# Patient Record
Sex: FEMALE | Race: OTHER | URBAN - METROPOLITAN AREA
[De-identification: names, ages, dates, MRNs, and addresses within clinical notes are randomized per-mention and may not be internally consistent; named-entity substitution may affect disease eponyms.]

---

## 2023-01-16 ENCOUNTER — APPOINTMENT (RX ONLY)
Dept: URBAN - METROPOLITAN AREA CLINIC 141 | Facility: CLINIC | Age: 83
Setting detail: DERMATOLOGY
End: 2023-01-16

## 2023-01-16 DIAGNOSIS — I87.2 VENOUS INSUFFICIENCY (CHRONIC) (PERIPHERAL): ICD-10-CM

## 2023-01-16 PROCEDURE — ? ENDOVENOUS ABLATION WITH VENASEAL

## 2023-01-16 PROCEDURE — 36483 ENDOVEN THER CHEM ADHES SBSQ: CPT | Mod: RT

## 2023-01-16 PROCEDURE — 36482 ENDOVEN THER CHEM ADHES 1ST: CPT | Mod: RT

## 2023-01-16 NOTE — PROCEDURE: ENDOVENOUS ABLATION WITH VENASEAL
Vein #1: Right Anterior Accessory Great Saphenous Vein
Amount Of Adhesive Delivered: 0.5
Evla Access: medial calf
Evla Access: medial thigh
Detail Level: Detailed
Add A New Paragraph For Post-Care?: No
Access Site Anesthesia Type: 1% lidocaine with epinephrine
Number Of Kits Used (Required For Inventory): 1
Medical Necessity Clause: Lot # [id number]
Medical Necessity Information: LCD Guidelines vary from payer to payer. Please check with your payer's policy to determine medical necessity.
Preamble Multiple Veins: Patient seen today for ultrasound-guided cyanoacrylate embolization of the right GSV and AAGSV by administration of medical adhesive VenaSeal. The procedure was explained in depth to the patient and informed consent was signed. Alternative treatment options discussed. Questions answered. The patient voiced understanding of the procedure and potential complications including but not limited to allergic recaption, infection, bleeding, DVT, pulmonary embolism, skin burns/discoloration, nerve injury, arterial injury, ulcer and paresthesia. \\n\\nThe patient was placed on the table in prone position. Using sterile technique and  .3 cc of 1% lidocaine was injected in the skin and subcutaneous tissues overlying the venous access site. The right GSV was accessed with micro puncture set under ultrasound guidance at the level of the medial calf. A micro puncture sheath was exchanged over the wire for a long vascular sheath with its tip positioned at the SFJ under ultrasound guidance. A catheter was then advanced through the sheath with its tip positioned in the SSV 5 cm from the SFJ, which was achieved under direct ultrasound guidance. Cyanoacrylate embolization was performed. \\n\\nTotal of 13 cm of the GSV were treated with  0.5 mL cyanoacrylate administered. \\n\\nThe sheath and catheter were then removed and hemostasis was achieved. \\n\\n\\nThe AAGSV was accessed in a similar manner. \\nThe positioning and treatment was the same . 0.5 ml adhesive delivered. 13 cm vein treated. \\n \\n\\n\\n Ultrasound examination immediately after the procedure demonstrated no evidence of DVT in the left lower extremity. \\n\\nSterile dressings were applied. The patient tolerated the procedure well without immediate complications and left the operating room ambulating in stable condition. \\n\\nPost-operative instructions given and reviewed with patient verbally and in writing. The patient was given written instructions and Dr Bolaños Bounds cell phone number to contact with any issues or concerns. Patient verbalized understanding of all instructions. Questions encouraged and answered.
Show Inventory Tab: Hide
Amount Of Adhesive Delivered: 0.6
Consent was obtained with risks, benefits, and alternatives discussed for the VenaSeal procedure. Photographs were taken.
Vein #2: Right Great Saphenous Vein
Preamble Single Vein: Patient seen today for ultrasound-guided cyanoacrylate embolization of the right GSV by administration of medical adhesive VenaSeal. The procedure was explained in depth to the patient and informed consent was signed. Alternative treatment options discussed. Questions answered. The patient voiced understanding of the procedure and potential complications including but not limited to allergic recaption, infection, bleeding, DVT, pulmonary embolism, skin burns/discoloration, nerve injury, arterial injury, ulcer and paresthesia. \\n\\nThe patient was placed on the table in prone position. Using sterile technique and  .3 cc of 1% lidocaine was injected in the skin and subcutaneous tissues overlying the venous access site. The right GSV was accessed with micro puncture set under ultrasound guidance at the level of the medial calf. A micro puncture sheath was exchanged over the wire for a long vascular sheath with its tip positioned at the SFJ under ultrasound guidance. A catheter was then advanced through the sheath with its tip positioned in the SSV 5 cm from the SFJ, which was achieved under direct ultrasound guidance. Cyanoacrylate embolization was performed. \\n\\nTotal of 43 cm of the vein were treated with -1.5 mL cyanoacrylate administered. \\n\\nThe sheath and catheter were then removed and hemostasis was achieved. Ultrasound examination immediately after the procedure demonstrated no evidence of DVT in the left lower extremity. \\n\\nSterile dressings were applied. The patient tolerated the procedure well without immediate complications and left the operating room ambulating in stable condition. \\n\\nPost-operative instructions given and reviewed with patient verbally and in writing. The patient was given written instructions and Dr Brown Para cell phone number to contact with any issues or concerns. Patient verbalized understanding of all instructions. Questions encouraged and answered.

## 2023-01-23 ENCOUNTER — APPOINTMENT (RX ONLY)
Dept: URBAN - METROPOLITAN AREA CLINIC 141 | Facility: CLINIC | Age: 83
Setting detail: DERMATOLOGY
End: 2023-01-23

## 2023-01-23 DIAGNOSIS — I87.2 VENOUS INSUFFICIENCY (CHRONIC) (PERIPHERAL): ICD-10-CM

## 2023-01-23 PROCEDURE — ? VARITHENA SCLEROTHERAPY

## 2023-01-23 PROCEDURE — 36465 NJX NONCMPND SCLRSNT 1 VEIN: CPT | Mod: RT

## 2023-01-23 ASSESSMENT — LOCATION DETAILED DESCRIPTION DERM: LOCATION DETAILED: RIGHT DISTAL PRETIBIAL REGION

## 2023-01-23 ASSESSMENT — LOCATION ZONE DERM: LOCATION ZONE: LEG

## 2023-01-23 ASSESSMENT — LOCATION SIMPLE DESCRIPTION DERM: LOCATION SIMPLE: RIGHT PRETIBIAL REGION

## 2023-01-23 NOTE — PROCEDURE: VARITHENA SCLEROTHERAPY
Lot # [id number]: [id number]
Sclerosant (A) %: 1
Show Inventory Tab: Hide
Sclerosant (A): Varithena Foam
Consent was obtained with risks, benefits, and alternatives discussed for the above procedures.
Disposition: Compression stockings and short stretch elastic bandages were placed postoperatively.
Volume Of Varithena Foam Removed From Canister (Cc): 0
Render Post Care In Note: No
Sclerosant Volume (Cc): 3
Expiration Date A (Month Year): IRJ0255
Detail Level: Detailed

## 2023-02-06 ENCOUNTER — APPOINTMENT (RX ONLY)
Dept: URBAN - METROPOLITAN AREA CLINIC 141 | Facility: CLINIC | Age: 83
Setting detail: DERMATOLOGY
End: 2023-02-06

## 2023-02-06 DIAGNOSIS — I87.2 VENOUS INSUFFICIENCY (CHRONIC) (PERIPHERAL): ICD-10-CM

## 2023-02-06 PROCEDURE — ? VARITHENA SCLEROTHERAPY

## 2023-02-06 PROCEDURE — 36465 NJX NONCMPND SCLRSNT 1 VEIN: CPT | Mod: LT

## 2023-02-06 ASSESSMENT — LOCATION ZONE DERM: LOCATION ZONE: LEG

## 2023-02-06 ASSESSMENT — LOCATION SIMPLE DESCRIPTION DERM: LOCATION SIMPLE: LEFT PRETIBIAL REGION

## 2023-02-06 ASSESSMENT — LOCATION DETAILED DESCRIPTION DERM: LOCATION DETAILED: LEFT DISTAL PRETIBIAL REGION

## 2023-02-06 NOTE — PROCEDURE: VARITHENA SCLEROTHERAPY
Consent was obtained with risks, benefits, and alternatives discussed for the above procedures.
Expiration Date A (Month Year): [id number]
Number Of Injections (Will Not Render If 0): 1
Volume Of Varithena Foam Removed From Canister (Cc): 0
Sclerosant Volume (Cc): 3
Render Post Care In Note: No
Disposition: Compression stockings and short stretch elastic bandages were placed postoperatively.
Lot # [id number]: [id number]
Detail Level: Detailed
Show Inventory Tab: Hide
Sclerosant (A): Varithena Foam

## 2023-04-03 ENCOUNTER — APPOINTMENT (RX ONLY)
Dept: URBAN - METROPOLITAN AREA CLINIC 141 | Facility: CLINIC | Age: 83
Setting detail: DERMATOLOGY
End: 2023-04-03

## 2023-04-03 DIAGNOSIS — I87.2 VENOUS INSUFFICIENCY (CHRONIC) (PERIPHERAL): ICD-10-CM

## 2023-04-03 PROCEDURE — 93970 EXTREMITY STUDY: CPT

## 2023-04-03 PROCEDURE — ? LOWER EXTREMITY DOPPLER US

## 2023-04-03 NOTE — PROCEDURE: LOWER EXTREMITY DOPPLER US
Use - 'see Attached Documentation' Verbiage?: No
Size In Mm: 0.0
Size In Mm: 6.8
Size In Mm: 2.6
Right Lower Extremity Comments: TRIBS MEDIAL THIGH.
Continue Post-Procedural Therapy: Yes
Size Options: Use Free Text
Right Tributary Reflux (In Seconds - Leave Blank If Not Applicable): 0.9
Size In Mm: 3.1
Treatment Number (Optional): Long term F/U(3M)
See Attached Documentation Text: Please refer to the attached ultrasound documentation for complete details of the procedure and the venous findings.
Size In Mm: 3.0
Right Tributary Size In Mm: 3.5
Size In Mm: 2.8
Detail Level: Simple

## 2023-04-06 ENCOUNTER — APPOINTMENT (RX ONLY)
Dept: URBAN - METROPOLITAN AREA CLINIC 141 | Facility: CLINIC | Age: 83
Setting detail: DERMATOLOGY
End: 2023-04-06

## 2023-04-06 DIAGNOSIS — L85.3 XEROSIS CUTIS: ICD-10-CM

## 2023-04-06 DIAGNOSIS — Z71.89 OTHER SPECIFIED COUNSELING: ICD-10-CM

## 2023-04-06 DIAGNOSIS — L57.8 OTHER SKIN CHANGES DUE TO CHRONIC EXPOSURE TO NONIONIZING RADIATION: ICD-10-CM | Status: INADEQUATELY CONTROLLED

## 2023-04-06 DIAGNOSIS — L82.1 OTHER SEBORRHEIC KERATOSIS: ICD-10-CM

## 2023-04-06 DIAGNOSIS — L81.4 OTHER MELANIN HYPERPIGMENTATION: ICD-10-CM

## 2023-04-06 DIAGNOSIS — D18.0 HEMANGIOMA: ICD-10-CM

## 2023-04-06 DIAGNOSIS — D22 MELANOCYTIC NEVI: ICD-10-CM

## 2023-04-06 DIAGNOSIS — L72.0 EPIDERMAL CYST: ICD-10-CM

## 2023-04-06 PROBLEM — D22.5 MELANOCYTIC NEVI OF TRUNK: Status: ACTIVE | Noted: 2023-04-06

## 2023-04-06 PROBLEM — D18.01 HEMANGIOMA OF SKIN AND SUBCUTANEOUS TISSUE: Status: ACTIVE | Noted: 2023-04-06

## 2023-04-06 PROCEDURE — ? COUNSELING

## 2023-04-06 PROCEDURE — ? SUNSCREEN TREATMENT REGIMEN

## 2023-04-06 PROCEDURE — ? SUNSCREEN RECOMMENDATIONS

## 2023-04-06 PROCEDURE — ? OTC TREATMENT REGIMEN

## 2023-04-06 PROCEDURE — 99213 OFFICE O/P EST LOW 20 MIN: CPT

## 2023-04-06 ASSESSMENT — LOCATION SIMPLE DESCRIPTION DERM
LOCATION SIMPLE: RIGHT FOREHEAD
LOCATION SIMPLE: LEFT LOWER BACK
LOCATION SIMPLE: RIGHT BACK
LOCATION SIMPLE: NOSE
LOCATION SIMPLE: RIGHT ANTERIOR NECK
LOCATION SIMPLE: RIGHT HAND
LOCATION SIMPLE: LEFT CHEEK
LOCATION SIMPLE: RIGHT SHOULDER
LOCATION SIMPLE: ABDOMEN
LOCATION SIMPLE: CHEST
LOCATION SIMPLE: LEFT SHOULDER
LOCATION SIMPLE: RIGHT PRETIBIAL REGION
LOCATION SIMPLE: RIGHT CHEEK
LOCATION SIMPLE: RIGHT UPPER ARM
LOCATION SIMPLE: RIGHT UPPER BACK
LOCATION SIMPLE: RIGHT FOREARM
LOCATION SIMPLE: LEFT UPPER BACK
LOCATION SIMPLE: LEFT PRETIBIAL REGION
LOCATION SIMPLE: LEFT ANTERIOR NECK
LOCATION SIMPLE: LEFT FOREHEAD
LOCATION SIMPLE: LEFT FOREARM
LOCATION SIMPLE: LEFT HAND

## 2023-04-06 ASSESSMENT — LOCATION DETAILED DESCRIPTION DERM
LOCATION DETAILED: RIGHT SUPERIOR LATERAL UPPER BACK
LOCATION DETAILED: LEFT SUPERIOR UPPER BACK
LOCATION DETAILED: LEFT SUPERIOR LATERAL MIDBACK
LOCATION DETAILED: RIGHT PROXIMAL DORSAL FOREARM
LOCATION DETAILED: RIGHT CLAVICULAR NECK
LOCATION DETAILED: NASAL ROOT
LOCATION DETAILED: LEFT CENTRAL MALAR CHEEK
LOCATION DETAILED: RIGHT MID-UPPER BACK
LOCATION DETAILED: RIGHT RADIAL DORSAL HAND
LOCATION DETAILED: RIGHT CENTRAL MALAR CHEEK
LOCATION DETAILED: RIGHT POSTERIOR SHOULDER
LOCATION DETAILED: LEFT RADIAL DORSAL HAND
LOCATION DETAILED: RIGHT ANTERIOR DISTAL UPPER ARM
LOCATION DETAILED: RIGHT DISTAL DORSAL FOREARM
LOCATION DETAILED: LEFT MID-UPPER BACK
LOCATION DETAILED: RIGHT INFERIOR FOREHEAD
LOCATION DETAILED: RIGHT FOREHEAD
LOCATION DETAILED: LEFT POSTERIOR SHOULDER
LOCATION DETAILED: LEFT LATERAL ABDOMEN
LOCATION DETAILED: RIGHT INFERIOR UPPER BACK
LOCATION DETAILED: LEFT DISTAL PRETIBIAL REGION
LOCATION DETAILED: LEFT DISTAL DORSAL FOREARM
LOCATION DETAILED: LEFT INFERIOR FOREHEAD
LOCATION DETAILED: LEFT PROXIMAL DORSAL FOREARM
LOCATION DETAILED: RIGHT INFERIOR ANTERIOR NECK
LOCATION DETAILED: SUBXIPHOID
LOCATION DETAILED: LEFT INFERIOR ANTERIOR NECK
LOCATION DETAILED: LEFT MEDIAL SUPERIOR CHEST
LOCATION DETAILED: RIGHT DISTAL PRETIBIAL REGION
LOCATION DETAILED: RIGHT SUPERIOR UPPER BACK
LOCATION DETAILED: RIGHT LATERAL ABDOMEN

## 2023-04-06 ASSESSMENT — LOCATION ZONE DERM
LOCATION ZONE: LEG
LOCATION ZONE: TRUNK
LOCATION ZONE: NECK
LOCATION ZONE: FACE
LOCATION ZONE: NOSE
LOCATION ZONE: ARM
LOCATION ZONE: HAND

## 2023-05-01 ENCOUNTER — APPOINTMENT (RX ONLY)
Dept: URBAN - METROPOLITAN AREA CLINIC 141 | Facility: CLINIC | Age: 83
Setting detail: DERMATOLOGY
End: 2023-05-01

## 2023-05-01 DIAGNOSIS — I87.2 VENOUS INSUFFICIENCY (CHRONIC) (PERIPHERAL): ICD-10-CM

## 2023-05-01 PROCEDURE — 99213 OFFICE O/P EST LOW 20 MIN: CPT

## 2023-05-01 PROCEDURE — ? FOLLOW UP ORDERS

## 2023-05-01 NOTE — PROCEDURE: FOLLOW UP ORDERS
Follow-Up (Free Text): She will follow up with an [address] in one year.
Detail Level: Zone
Follow-Up Preamble: Patient is s/p ablations of the bilateral GSV and UGS. Her legs feel like she still has RLS and she gets some numbness. \\n\\nCurrent US shows no axial reflux.

## 2023-05-01 NOTE — HPI: VARICOSE VEINS (VARICOSITIES)
How Severe Are They?: mild
Is This A New Presentation, Or A Follow-Up?: Follow Up Varicosities
Additional History: Pt feels like her There has been no improvement and feels her RLS has stayed the same.